# Patient Record
Sex: MALE | URBAN - METROPOLITAN AREA
[De-identification: names, ages, dates, MRNs, and addresses within clinical notes are randomized per-mention and may not be internally consistent; named-entity substitution may affect disease eponyms.]

---

## 2023-11-24 ENCOUNTER — HOSPITAL ENCOUNTER (EMERGENCY)
Facility: HOSPITAL | Age: 23
Discharge: HOME | End: 2023-11-24
Attending: EMERGENCY MEDICINE

## 2023-11-24 VITALS
TEMPERATURE: 97.3 F | HEART RATE: 71 BPM | DIASTOLIC BLOOD PRESSURE: 65 MMHG | SYSTOLIC BLOOD PRESSURE: 104 MMHG | RESPIRATION RATE: 16 BRPM | OXYGEN SATURATION: 97 %

## 2023-11-24 DIAGNOSIS — R11.2 NAUSEA AND VOMITING: ICD-10-CM

## 2023-11-24 DIAGNOSIS — F10.929 ALCOHOL INTOXICATION (HCC): Primary | ICD-10-CM

## 2023-11-24 LAB — GLUCOSE SERPL-MCNC: 111 MG/DL (ref 65–140)

## 2023-11-24 PROCEDURE — 82948 REAGENT STRIP/BLOOD GLUCOSE: CPT

## 2023-11-24 PROCEDURE — 99284 EMERGENCY DEPT VISIT MOD MDM: CPT

## 2023-11-24 RX ORDER — ONDANSETRON 4 MG/1
4 TABLET, ORALLY DISINTEGRATING ORAL ONCE
Status: COMPLETED | OUTPATIENT
Start: 2023-11-24 | End: 2023-11-24

## 2023-11-24 RX ADMIN — ONDANSETRON 4 MG: 4 TABLET, ORALLY DISINTEGRATING ORAL at 03:25

## 2023-11-24 NOTE — ED NOTES
Attempted to call patient's sister at [phone] two times with no response     Debrah Mcardle, RN  11/24/23 6274

## 2023-11-24 NOTE — DISCHARGE INSTRUCTIONS
Abstain from drinking alcohol. Increase your fluids today to maintain your hydration. Abstenerse de beber alcohol. Aumente troy líquidos hoy para mantener ellison hidratación.

## 2023-11-24 NOTE — ED PROVIDER NOTES
History  Chief Complaint   Patient presents with    Alcohol Intoxication     Patient KASHIF after [employer]  dropped him off at his address this morning. Patient was vomiting in the back seat of the [employer] and is unable to walk on his own. Patient stated he drank a lot of rum with his family tonight     The patient is a 78-year-old male with no known PMH presenting for evaluation of EtOH intoxication and vomiting. The patient notes he was drinking rum this evening and [employer] and back to his house. Per EMS, the patient was vomiting in the back of the [employer] on the drive there. On getting to his house, he was slurring his speech and unable to walk on his own. He was unable to open the door and it appeared like no one was home. [address] was called. EMS arrived to confirm that that is his home address. Given his intoxication and unable to get him into his home, they bring him in to the ED for further evaluation. The patient currently denies all complaints. He denies trauma and falls. He denies headache, lightheadedness, chest pain, shortness of breath, abdominal pain, and nausea. He is with slurred speech but oriented to self, time, and situation. History provided by:  Patient  History limited by:  Mental status change (EtOH intoxication)   used: Yes        None       History reviewed. No pertinent past medical history. History reviewed. No pertinent surgical history. History reviewed. No pertinent family history. I have reviewed and agree with the history as documented. E-Cigarette/Vaping     E-Cigarette/Vaping Substances          Review of Systems   Unable to perform ROS: Mental status change   Respiratory:  Negative for shortness of breath. Cardiovascular:  Negative for chest pain. Gastrointestinal:  Positive for vomiting. Negative for abdominal pain and nausea. Vomiting reported by EMS   Musculoskeletal:  Positive for gait problem. Negative for back pain.    Skin: Negative for wound. Neurological:  Negative for dizziness, light-headedness and headaches. Physical Exam  Physical Exam  Vitals and nursing note reviewed. Constitutional:       General: He is not in acute distress. Appearance: Normal appearance. He is well-developed and normal weight. He is not ill-appearing, toxic-appearing or diaphoretic. Comments: Sleeping on initial examination. Wakes to speech and light touch. With slurred speech, partially answers questions. Starts to drift to sleep while answering questions. No evidence of trauma on head to toe exam.   HENT:      Head: Normocephalic and atraumatic. No contusion or laceration. Jaw: There is normal jaw occlusion. No tenderness, swelling, pain on movement or malocclusion. Nose: Nose normal. No nasal deformity or signs of injury. Mouth/Throat:      Lips: Pink. Mouth: Mucous membranes are moist.   Eyes:      Extraocular Movements: Extraocular movements intact. Conjunctiva/sclera: Conjunctivae normal.      Pupils: Pupils are equal, round, and reactive to light. Cardiovascular:      Rate and Rhythm: Normal rate and regular rhythm. Pulses: Normal pulses. Radial pulses are 2+ on the right side and 2+ on the left side. Dorsalis pedis pulses are 2+ on the right side and 2+ on the left side. Heart sounds: Normal heart sounds, S1 normal and S2 normal. No murmur heard. Pulmonary:      Effort: Pulmonary effort is normal. No tachypnea or respiratory distress. Breath sounds: Normal breath sounds and air entry. No stridor, decreased air movement or transmitted upper airway sounds. No decreased breath sounds. Abdominal:      Palpations: Abdomen is soft. Tenderness: There is no abdominal tenderness. There is no guarding or rebound. Musculoskeletal:         General: Normal range of motion. Cervical back: Neck supple. Right lower leg: No edema. Left lower leg: No edema. Skin:     General: Skin is warm and dry. Capillary Refill: Capillary refill takes less than 2 seconds. Findings: No rash or wound. Neurological:      General: No focal deficit present. Mental Status: He is lethargic. GCS: GCS eye subscore is 3. GCS verbal subscore is 4. GCS motor subscore is 6. Comments: MARINA, 5/5 strength throughout         Vital Signs  ED Triage Vitals [11/24/23 0259]   Temperature Pulse Respirations Blood Pressure SpO2   (!) 97.3 °F (36.3 °C) 86 18 104/65 98 %      Temp Source Heart Rate Source Patient Position - Orthostatic VS BP Location FiO2 (%)   Oral Monitor Lying Right arm --      Pain Score       --           Vitals:    11/24/23 0259 11/24/23 0415   BP: 104/65    Pulse: 86 71   Patient Position - Orthostatic VS: Lying          Visual Acuity      ED Medications  Medications   ondansetron (ZOFRAN-ODT) dispersible tablet 4 mg (4 mg Oral Given 11/24/23 0325)       Diagnostic Studies  Results Reviewed       Procedure Component Value Units Date/Time    Fingerstick Glucose (POCT) [[id number]]  (Normal) Collected: 11/24/23 0311    Lab Status: Final result Updated: 11/24/23 0312     POC Glucose 111 mg/dl                    No orders to display              Procedures  Procedures         ED Course  ED Course as of 11/24/23 0816   Fri Nov 24, 2023   0308 Triage VS WNL and stable   0317 POC Glucose: 111  WNL   0558 Patient ambulatory to the bathroom with one-person assist.  He is awake alert oriented x 4. He denies all complaints. He does not have his cell phone, but attempted to call his sister's phone number. No answer. Will have him try to call back. 0036 Still unable to reach sister. Patient has no other resource or getting into his home. Will await callback and continue to attempt to reach family. At this time, patient is medically clear and stable for discharge. [address] paperwork reviewed with emphasis on follow-up with PCP. Patient to abstain from alcohol. Patient to increase fluids to maintain hydration. Patient with improved appearance, in no acute distress, and ambulatory with steady gait. He has no further questions at this time. SBIRT 20yo+      Flowsheet Row Most Recent Value   Initial Alcohol Screen: US AUDIT-C     1. How often do you have a drink containing alcohol? 2 Filed at: 11/24/2023 0258   2. How many drinks containing alcohol do you have on a typical day you are drinking? 2 Filed at: 11/24/2023 0258   3a. Male UNDER 65: How often do you have five or more drinks on one occasion? 0 Filed at: 11/24/2023 0258   Audit-C Score 4 Filed at: 11/24/2023 3068   AVA: How many times in the past year have you. .. Used an illegal drug or used a prescription medication for non-medical reasons? Never Filed at: 11/24/2023 0258                      Medical Decision Making  DDx including but not limited to: Alcohol intoxication, hypoglycemia    Patient without any complaints. He requests to sleep. When asked if he wants to call family ember to come pick him up, he notes he could call his sister but does not have her phone number. Plan for blood glucose check, continuous pulse ox, and Zofran ODT as patient vomited once since arrival to the ED. When clinically sober, will discharge home. Problems Addressed:  Alcohol intoxication ([address]): acute illness or injury  Nausea and vomiting: acute illness or injury    Amount and/or Complexity of Data Reviewed  Labs: ordered. Decision-making details documented in ED Course. Risk  Prescription drug management.              Disposition  Final diagnoses:   Alcohol intoxication ([address])   Nausea and vomiting     Time reflects when diagnosis was documented in both MDM as applicable and the Disposition within this note       Time User Action Codes Description Comment    11/24/2023  6:18 AM Juliet Costello Add [F10.929] Alcohol intoxication ([address])     11/24/2023  6:19 AM Shugars, Thera Cabot Add [R11.2] Nausea and vomiting           ED Disposition       ED Disposition   Discharge    Condition   Stable    Date/Time   Fri Nov 24, 2023 [address] discharge to home/self care. Follow-up Information       Follow up With Specialties Details Why Contact Info Additional [address] Family [facility] Schedule an appointment as soon as possible for a visit in 1 week or your primary care provider [address] [place] [phone]   [phone]            Patient's Medications    No medications on file       No discharge procedures on file.     PDMP Review       None            ED Provider  Electronically Signed by             [facility], [address] [place] [address]  11/24/23 6378

## 2025-03-07 ENCOUNTER — APPOINTMENT (OUTPATIENT)
Dept: LAB | Facility: CLINIC | Age: 25
End: 2025-03-07

## 2025-03-07 DIAGNOSIS — Z31.441 ENCOUNTER FOR TESTING OF MALE PARTNER OF FEMALE WITH RECURRENT PREGNANCY LOSS: ICD-10-CM

## 2025-03-07 LAB
B ABORTUS AB SMN QL AGGL: ABNORMAL
COLLECTION DATE & TIME: ABNORMAL
LIQUEFACTION TIME SMN: 30 MIN (ref 0–30)
PH SMN: 7.9 [PH] (ref 7.2–8.6)
SEX ABSTIN DURATION TIME PATIENT: 5 D (ref 2–7)
SPECIMEN VOL SMN: 1.2 ML (ref 1–5)
SPERM # SMN: 98.4 MILLION/EJACULATION (ref 40–1000)
SPERM AMORPHOUS HEAD NFR SMN: 3 %
SPERM MORPH PNL SMN: 15
SPERM MOTILE SMN QL MICRO: 76 %
SPERM MOTILITY SCORE SMN AUTO: 3 (ref 2–4)
SPERM PROG NFR SMN: 3 % (ref 2–4)
SPERM SMN: 0 %
SPERM SMN: 0 %
SPERM SMN: 1 %
SPERM SMN: 16 %
SPERM SMN: 17 %
SPERM SMN: 17 %
SPERM SMN: 19 %
SPERM SMN: 5 %
SPERM SMN: 7 %
SPERM SMN: 7 % (ref 50–100)
SPERM SMN: 8 %
SPERM SMN: 82 /ML (ref 20–999)
SPERM SMN: 93 % (ref 0–50)
VISC SMN: 4 CP (ref 3–4)
WBC SMN QL: 1 HPF

## 2025-03-07 PROCEDURE — 89320 SEMEN ANAL VOL/COUNT/MOT: CPT
